# Patient Record
Sex: MALE | Race: WHITE
[De-identification: names, ages, dates, MRNs, and addresses within clinical notes are randomized per-mention and may not be internally consistent; named-entity substitution may affect disease eponyms.]

---

## 2020-10-13 ENCOUNTER — HOSPITAL ENCOUNTER (EMERGENCY)
Dept: HOSPITAL 47 - EC | Age: 30
Discharge: HOME | End: 2020-10-13
Payer: COMMERCIAL

## 2020-10-13 VITALS
HEART RATE: 72 BPM | RESPIRATION RATE: 18 BRPM | DIASTOLIC BLOOD PRESSURE: 72 MMHG | SYSTOLIC BLOOD PRESSURE: 114 MMHG | TEMPERATURE: 98.6 F

## 2020-10-13 DIAGNOSIS — F17.200: ICD-10-CM

## 2020-10-13 DIAGNOSIS — Z20.828: ICD-10-CM

## 2020-10-13 DIAGNOSIS — Z88.0: ICD-10-CM

## 2020-10-13 DIAGNOSIS — J06.9: Primary | ICD-10-CM

## 2020-10-13 PROCEDURE — 71046 X-RAY EXAM CHEST 2 VIEWS: CPT

## 2020-10-13 PROCEDURE — 99284 EMERGENCY DEPT VISIT MOD MDM: CPT

## 2020-10-13 NOTE — ED
URI HPI





- General


Chief Complaint: Upper Respiratory Infection


Stated Complaint: Covid Symptoms


Time Seen by Provider: 10/13/20 19:03


Source: patient, RN notes reviewed, old records reviewed


Mode of arrival: ambulatory


Limitations: no limitations





- History of Present Illness


Initial Comments: 





30-year-old male presents the ER today for evaluation for concern for Raynaud's 

sinus congestion and minor cough for the past week.  He has had fevers and 

chills.  Complaining of red eyes and irritation.  He also states he has lost his

sense of smell and taste for the past week.  Patient reports that he's had no 

history of known exposure to cope at 18 but wants to be tested today.  He has 

been taking Mucinex over-the-counter.





- Related Data


                                  Previous Rx's











 Medication  Instructions  Recorded


 


Ibuprofen [Motrin] 600 mg PO Q8HR PRN #30 tab 06/24/15


 


SILVER sulfADIAZINE CREAM 1 applic TOPICAL BID #30 gram 06/24/15





[Silvadene Cream]  


 


Albuterol Inhaler [Ventolin Hfa 1 puff INHALATION RT-QID #1 inhaler 10/13/20





Inhaler]  


 


predniSONE [Deltasone] 20 mg PO AS DIRECTED #12 tab 10/13/20











                                    Allergies











Allergy/AdvReac Type Severity Reaction Status Date / Time


 


Penicillins Allergy  Vomiting Verified 06/24/15 18:24














Review of Systems


ROS Statement: 


Those systems with pertinent positive or pertinent negative responses have been 

documented in the HPI.





ROS Other: All systems not noted in ROS Statement are negative.





Past Medical History


Past Medical History: No Reported History


History of Any Multi-Drug Resistant Organisms: None Reported


Past Surgical History: No Surgical Hx Reported


Past Psychological History: Anxiety, Depression


Smoking Status: Current every day smoker


Past Alcohol Use History: None Reported


Past Drug Use History: Marijuana





General Exam





- General Exam Comments


Initial Comments: 





30-year-old male.  Alert and oriented 3.  No acute distress.


Limitations: no limitations


General appearance: alert, in no apparent distress


Head exam: Present: atraumatic, normocephalic, normal inspection


Eye exam: Present: normal appearance, PERRL, EOMI.  Absent: scleral icterus, 

conjunctival injection, periorbital swelling


ENT exam: Present: normal exam, mucous membranes moist


Neck exam: Present: normal inspection.  Absent: tenderness, meningismus, 

lymphadenopathy


Respiratory exam: Present: normal lung sounds bilaterally.  Absent: respiratory 

distress, wheezes, rales, rhonchi, stridor


Cardiovascular Exam: Present: regular rate, normal rhythm, normal heart sounds. 

 Absent: systolic murmur, diastolic murmur, rubs, gallop, clicks


GI/Abdominal exam: Present: soft, normal bowel sounds.  Absent: distended, 

tenderness, guarding, rebound, rigid


Extremities exam: Present: normal inspection, full ROM, normal capillary refill.

  Absent: tenderness, pedal edema, joint swelling, calf tenderness


Back exam: Present: normal inspection


Neurological exam: Present: alert, oriented X3, CN II-XII intact


Psychiatric exam: Present: normal affect, normal mood


Skin exam: Present: warm, dry, intact, normal color.  Absent: rash





Course


                                   Vital Signs











  10/13/20





  17:48


 


Temperature 98.6 F


 


Pulse Rate 72


 


Respiratory 18





Rate 


 


Blood Pressure 114/72


 


O2 Sat by Pulse 98





Oximetry 














Medical Decision Making





- Medical Decision Making





30-year-old male presents to the ER today for evaluation for complaints of upper

 respiratory congestion for the past week.  He is concerned for coated 19 virus 

infection.  Reports he lost his sense of smell.  He does have some sinus 

congestion.  Lungs are clear to auscultation.  Chest x-ray was reviewed and 

negative for acute cardiopulmonary process.  Patient was tested for coated 19 

and discussed the results results will not return for 2-3 days.  I discussed 

Patient should self quarantined.  We'll start the Patient on steroids anti-

inflammatory medicine close PCP follow-up if symptoms continue to persist or 

worsening symptoms to return.





- Radiology Data


Radiology results: report reviewed


Chest x-rays reviewed and negative for acute cardiopulmonary process.





Disposition


Clinical Impression: 


 URI (upper respiratory infection), Suspected COVID-19 virus infection





Disposition: HOME SELF-CARE


Condition: Good


Instructions (If sedation given, give patient instructions):  Upper Respiratory 

Infection (ED)


Additional Instructions: 


Patient should self quarantine until symptoms have resolved.  Results of the 

Covid 19  will be available within the next 1-2 days.  Patient should increase 

fluid intake.  Alternate Tylenol and Motrin for body aches.  Patient continues 

to steroids and inhaler as needed for cough.


Prescriptions: 


predniSONE [Deltasone] 20 mg PO AS DIRECTED #12 tab


Albuterol Inhaler [Ventolin Hfa Inhaler] 1 puff INHALATION RT-QID #1 inhaler


Is patient prescribed a controlled substance at d/c from ED?: No


Referrals: 


Sandeep Morales MD [Primary Care Provider] - 1-2 days


Time of Disposition: 20:10

## 2020-10-13 NOTE — XR
EXAMINATION TYPE: XR chest 2V

 

DATE OF EXAM: 10/13/2020

 

COMPARISON: NONE

 

HISTORY: Cough. Short of breath

 

TECHNIQUE:

 

FINDINGS: Heart and mediastinum are normal. Lungs are clear. Diaphragm is normal. Bony thorax appears
 normal.

 

IMPRESSION: Normal chest.

## 2022-12-17 ENCOUNTER — HOSPITAL ENCOUNTER (EMERGENCY)
Dept: HOSPITAL 47 - EC | Age: 32
LOS: 1 days | Discharge: HOME | End: 2022-12-18
Payer: COMMERCIAL

## 2022-12-17 VITALS
SYSTOLIC BLOOD PRESSURE: 135 MMHG | TEMPERATURE: 98 F | RESPIRATION RATE: 20 BRPM | DIASTOLIC BLOOD PRESSURE: 90 MMHG | HEART RATE: 80 BPM

## 2022-12-17 DIAGNOSIS — F17.200: ICD-10-CM

## 2022-12-17 DIAGNOSIS — S01.81XA: Primary | ICD-10-CM

## 2022-12-17 DIAGNOSIS — F12.90: ICD-10-CM

## 2022-12-17 DIAGNOSIS — Z88.0: ICD-10-CM
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
## 2022-12-17 DIAGNOSIS — M25.511: ICD-10-CM

## 2022-12-17 DIAGNOSIS — M54.50: ICD-10-CM

## 2022-12-17 PROCEDURE — 72100 X-RAY EXAM L-S SPINE 2/3 VWS: CPT

## 2022-12-17 PROCEDURE — 90471 IMMUNIZATION ADMIN: CPT

## 2022-12-17 PROCEDURE — 12011 RPR F/E/E/N/L/M 2.5 CM/<: CPT

## 2022-12-17 PROCEDURE — 70450 CT HEAD/BRAIN W/O DYE: CPT

## 2022-12-17 PROCEDURE — 99284 EMERGENCY DEPT VISIT MOD MDM: CPT

## 2022-12-17 PROCEDURE — 90715 TDAP VACCINE 7 YRS/> IM: CPT
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
## 2022-12-18 NOTE — XR
EXAMINATION TYPE: XR lumbar spine 2 or 3V

 

DATE OF EXAM: 12/18/2022

 

COMPARISON: NONE

 

HISTORY: Back pain

 

TECHNIQUE: 3 views

 

FINDINGS: Lumbar vertebra have normal alignment. Posterior elements are intact. Sacroiliac joints are
 intact. No evidence of compression fracture. Disc spaces are normal.

 

IMPRESSION: Negative lumbar spine exam. No fracture.

## 2022-12-18 NOTE — XR
EXAMINATION TYPE: XR shoulder complete RT

 

DATE OF EXAM: 12/18/2022

 

COMPARISON: NONE

 

HISTORY: Shoulder pain

 

TECHNIQUE: 3 view

 

FINDINGS: There is no sign of fracture nor dislocation. Joint spaces are normal. There are no patholo
gic calcifications.

 

IMPRESSION: Negative right shoulder exam.
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
